# Patient Record
Sex: FEMALE | Race: ASIAN | NOT HISPANIC OR LATINO | Employment: OTHER | URBAN - METROPOLITAN AREA
[De-identification: names, ages, dates, MRNs, and addresses within clinical notes are randomized per-mention and may not be internally consistent; named-entity substitution may affect disease eponyms.]

---

## 2022-01-24 NOTE — PATIENT DISCUSSION
CATARACT SURGERY PLANNER - MULTIFOCAL IOL/+FEMTO: Phacoemulsification with IOL: Eye: OD|DOS: 01/28/2022|Model: [id number]|Power: +24.50|Target: PLANO|Femto: YES|Arcs: 30 @ 160 ; 30 @ 340|Axis: -|Visc: DUET|Omidria: YES|10% Phenylephrine: YES|Epi-shugarcaine: YES|Phaco Setting: STANDARD|BSS+: NO|Trypan Blue: NO|CTR: NO|Olive Tip: NO|Atropine: @SFSC|Pupilloplasty: NO|Notes: ATROPINE @ [facility]. IDDM.

## 2022-01-24 NOTE — PATIENT DISCUSSION
Vivity lens, OD. Your seen in the emergency department today for chest pain.  Follow-up with the cardiologist as you have scheduled.  Follow up with the primary care physician as well.  Do not drive or operate machinery while taking the pain medication.  Return with worse pain, fever, chills, chest pain, shortness of breath, any other worrisome concerns.

## 2022-02-03 NOTE — PATIENT DISCUSSION
CATARACT SURGERY PLANNER - MULTIFOCAL IOL/+FEMTO: Phacoemulsification with IOL: Eye: OS|DOS: 02/15/2022|Model: [id number]|Power: +24.00|Target: PLANO|Femto: YES|Arcs: 45 @ 135 ; 45 @ 315|Axis: -|Visc: DUET|Omidria: YES|10% Phenylephrine: YES|Epi-shugarcaine: YES|Phaco Setting: STANDARD|BSS+: NO|Trypan Blue: NO|CTR: NO|Olive Tip: NO|Atropine: NO|Pupilloplasty: NO|Notes: ATROPINE @ [facility]. VALERIE Nelson Tammie

## 2022-03-18 NOTE — PATIENT DISCUSSION
D/w patient residual astigmatism OU affecting vision.  Pt. to get prescription reading glasses in the interim to help with near.  [facility] to correct cylinder.  Pt. to consider this option and will f/u in 2 months for RC and refraction.

## 2022-05-27 NOTE — PATIENT DISCUSSION
Discussed the option of Ernesto Jauregui laser enhancement. Will schedule LVC to correct residual astigmatism.

## 2022-06-08 NOTE — PATIENT DISCUSSION
She feels like her left eye is actually performing worse than her right eye. She is using artificial tears at least three times a day sometimes more. Discussed that her dry eye is likely what is causing her visual disturbance. We would like to get her dry eye under control and then redo her lasik scans. Will have her start Fluorometholone three times a day for two weeks and then stop. Will then have her start Manohar Man[place]as twice a day in both eyes. Discussed it helps treat ocular surface inflammation.

## 2022-06-08 NOTE — PATIENT DISCUSSION
Franc Nip: Patient counseled as to delayed onset effect of Anabel Mukherjee. Discussed onset of action may take 2-3 months to achieve maximum effect. Reviewed with patient mild degree of irritation and burning with onset of usage. Patient verbalizes understanding and wishes to start medication. Will e-prescribe medication to patient's pharmacy and follow up to determine effect.

## 2022-07-20 NOTE — PATIENT DISCUSSION
Arina Blank: Patient counseled as to delayed onset effect of Hong Blanco. Discussed onset of action may take 2-3 months to achieve maximum effect. Reviewed with patient mild degree of irritation and burning with onset of usage. Patient verbalizes understanding and wishes to start medication. Will e-prescribe medication to patient's pharmacy and follow up to determine effect.

## 2022-07-20 NOTE — PATIENT DISCUSSION
Discussed residual refractive error. Refraction has stayed consistent over last two visits.  Plan Ernesto 86 touch up OS.  Will schedule today and plan on using [facility] to prevent haze.  R/b/a d/w patient including corneal haze, infection, residual refractive error and patient elects to proceed.

## 2022-09-22 NOTE — PATIENT DISCUSSION
Patient  would like to continue to monitor due to her still fluctuating in vision. MRX is better than 2 months ago. 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 F/U in 3 months with Refraction. no PRK at this time.

## 2022-09-22 NOTE — PATIENT DISCUSSION
Discussed the option of Ernesto aJuregui laser enhancement. Will schedule LVC to correct residual astigmatism.

## 2023-04-07 ENCOUNTER — CONSULTATION/EVALUATION (OUTPATIENT)
Dept: URBAN - METROPOLITAN AREA CLINIC 36 | Facility: CLINIC | Age: 33
End: 2023-04-07

## 2023-04-07 DIAGNOSIS — Z83.511: ICD-10-CM

## 2023-04-07 DIAGNOSIS — H40.023: ICD-10-CM

## 2023-04-07 PROCEDURE — 92250 FUNDUS PHOTOGRAPHY W/I&R: CPT

## 2023-04-07 PROCEDURE — 92020 GONIOSCOPY: CPT

## 2023-04-07 PROCEDURE — 92004 COMPRE OPH EXAM NEW PT 1/>: CPT

## 2023-04-07 ASSESSMENT — VISUAL ACUITY
OD_CC: 20/25
OS_CC: 20/25

## 2023-04-07 ASSESSMENT — TONOMETRY
OD_IOP_MMHG: 16
OS_IOP_MMHG: 16

## 2023-05-12 ENCOUNTER — FOLLOW UP (OUTPATIENT)
Dept: URBAN - METROPOLITAN AREA CLINIC 46 | Facility: CLINIC | Age: 33
End: 2023-05-12

## 2023-05-12 DIAGNOSIS — H40.023: ICD-10-CM

## 2023-05-12 DIAGNOSIS — Z83.511: ICD-10-CM

## 2023-05-12 PROCEDURE — 92012 INTRM OPH EXAM EST PATIENT: CPT

## 2023-05-12 PROCEDURE — 92083 EXTENDED VISUAL FIELD XM: CPT

## 2023-05-12 ASSESSMENT — TONOMETRY
OS_IOP_MMHG: 15
OD_IOP_MMHG: 15

## 2023-05-12 ASSESSMENT — VISUAL ACUITY
OS_CC: 20/40-1
OD_CC: 20/25-1

## 2024-09-13 ENCOUNTER — CONSULTATION/EVALUATION (OUTPATIENT)
Dept: URBAN - METROPOLITAN AREA CLINIC 46 | Facility: CLINIC | Age: 34
End: 2024-09-13

## 2024-09-13 DIAGNOSIS — H40.1131: ICD-10-CM

## 2024-09-13 PROCEDURE — 92250 FUNDUS PHOTOGRAPHY W/I&R: CPT

## 2024-09-13 PROCEDURE — 92004 COMPRE OPH EXAM NEW PT 1/>: CPT

## 2024-09-13 PROCEDURE — 76514 ECHO EXAM OF EYE THICKNESS: CPT
